# Patient Record
Sex: MALE | Race: WHITE | NOT HISPANIC OR LATINO
[De-identification: names, ages, dates, MRNs, and addresses within clinical notes are randomized per-mention and may not be internally consistent; named-entity substitution may affect disease eponyms.]

---

## 2023-11-28 PROBLEM — Z00.00 ENCOUNTER FOR PREVENTIVE HEALTH EXAMINATION: Status: ACTIVE | Noted: 2023-11-28

## 2023-12-04 ENCOUNTER — APPOINTMENT (OUTPATIENT)
Dept: PEDIATRIC ORTHOPEDIC SURGERY | Facility: CLINIC | Age: 28
End: 2023-12-04
Payer: COMMERCIAL

## 2023-12-04 PROCEDURE — 72082 X-RAY EXAM ENTIRE SPI 2/3 VW: CPT

## 2023-12-04 PROCEDURE — 99204 OFFICE O/P NEW MOD 45 MIN: CPT | Mod: 25

## 2024-01-29 ENCOUNTER — OUTPATIENT (OUTPATIENT)
Dept: OUTPATIENT SERVICES | Facility: HOSPITAL | Age: 29
LOS: 1 days | End: 2024-01-29

## 2024-01-29 VITALS
HEART RATE: 74 BPM | WEIGHT: 253.97 LBS | DIASTOLIC BLOOD PRESSURE: 83 MMHG | OXYGEN SATURATION: 98 % | TEMPERATURE: 98 F | HEIGHT: 69 IN | SYSTOLIC BLOOD PRESSURE: 123 MMHG | RESPIRATION RATE: 16 BRPM

## 2024-01-29 DIAGNOSIS — M41.125 ADOLESCENT IDIOPATHIC SCOLIOSIS, THORACOLUMBAR REGION: ICD-10-CM

## 2024-01-29 LAB
A1C WITH ESTIMATED AVERAGE GLUCOSE RESULT: 5.1 % — SIGNIFICANT CHANGE UP (ref 4–5.6)
ALBUMIN SERPL ELPH-MCNC: 4.7 G/DL — SIGNIFICANT CHANGE UP (ref 3.3–5)
ALP SERPL-CCNC: 79 U/L — SIGNIFICANT CHANGE UP (ref 40–120)
ALT FLD-CCNC: 49 U/L — HIGH (ref 4–41)
ANION GAP SERPL CALC-SCNC: 13 MMOL/L — SIGNIFICANT CHANGE UP (ref 7–14)
AST SERPL-CCNC: 30 U/L — SIGNIFICANT CHANGE UP (ref 4–40)
BILIRUB SERPL-MCNC: 0.5 MG/DL — SIGNIFICANT CHANGE UP (ref 0.2–1.2)
BLD GP AB SCN SERPL QL: NEGATIVE — SIGNIFICANT CHANGE UP
BUN SERPL-MCNC: 9 MG/DL — SIGNIFICANT CHANGE UP (ref 7–23)
CALCIUM SERPL-MCNC: 9.5 MG/DL — SIGNIFICANT CHANGE UP (ref 8.4–10.5)
CHLORIDE SERPL-SCNC: 103 MMOL/L — SIGNIFICANT CHANGE UP (ref 98–107)
CO2 SERPL-SCNC: 24 MMOL/L — SIGNIFICANT CHANGE UP (ref 22–31)
CREAT SERPL-MCNC: 0.71 MG/DL — SIGNIFICANT CHANGE UP (ref 0.5–1.3)
EGFR: 129 ML/MIN/1.73M2 — SIGNIFICANT CHANGE UP
ESTIMATED AVERAGE GLUCOSE: 100 — SIGNIFICANT CHANGE UP
GLUCOSE SERPL-MCNC: 78 MG/DL — SIGNIFICANT CHANGE UP (ref 70–99)
HCT VFR BLD CALC: 43.7 % — SIGNIFICANT CHANGE UP (ref 39–50)
HGB BLD-MCNC: 14.9 G/DL — SIGNIFICANT CHANGE UP (ref 13–17)
MCHC RBC-ENTMCNC: 29.4 PG — SIGNIFICANT CHANGE UP (ref 27–34)
MCHC RBC-ENTMCNC: 34.1 GM/DL — SIGNIFICANT CHANGE UP (ref 32–36)
MCV RBC AUTO: 86.4 FL — SIGNIFICANT CHANGE UP (ref 80–100)
NRBC # BLD: 0 /100 WBCS — SIGNIFICANT CHANGE UP (ref 0–0)
NRBC # FLD: 0 K/UL — SIGNIFICANT CHANGE UP (ref 0–0)
PLATELET # BLD AUTO: 226 K/UL — SIGNIFICANT CHANGE UP (ref 150–400)
POTASSIUM SERPL-MCNC: 3.6 MMOL/L — SIGNIFICANT CHANGE UP (ref 3.5–5.3)
POTASSIUM SERPL-SCNC: 3.6 MMOL/L — SIGNIFICANT CHANGE UP (ref 3.5–5.3)
PROT SERPL-MCNC: 7.8 G/DL — SIGNIFICANT CHANGE UP (ref 6–8.3)
RBC # BLD: 5.06 M/UL — SIGNIFICANT CHANGE UP (ref 4.2–5.8)
RBC # FLD: 12.2 % — SIGNIFICANT CHANGE UP (ref 10.3–14.5)
RH IG SCN BLD-IMP: POSITIVE — SIGNIFICANT CHANGE UP
RH IG SCN BLD-IMP: POSITIVE — SIGNIFICANT CHANGE UP
SODIUM SERPL-SCNC: 140 MMOL/L — SIGNIFICANT CHANGE UP (ref 135–145)
WBC # BLD: 7.56 K/UL — SIGNIFICANT CHANGE UP (ref 3.8–10.5)
WBC # FLD AUTO: 7.56 K/UL — SIGNIFICANT CHANGE UP (ref 3.8–10.5)

## 2024-01-29 NOTE — H&P PST ADULT - PROBLEM SELECTOR PLAN 1
Patient tentatively scheduled for T4-L4 posterior spinal fusion with instrumentation,  - muscle flap reconstruction on 02/07/2024.  Pre-op instructions provided. Pt given verbal and written instructions with teach back on chlorhexidine wash x 3 days  and pepcid. Pt verbalized understanding with return demonstration.  Labs done.

## 2024-01-29 NOTE — H&P PST ADULT - HISTORY OF PRESENT ILLNESS
28 year old male with pre op dx of adolescent idiopathic scoliosis of thoracolumbar region is scheduled for T4-L4 posterior spinal fusion with instrumentation,  - muscle flap reconstruction. 28 year old male with hx of scoliosis presents to PST with pre op dx of adolescent idiopathic scoliosis of thoracolumbar region is scheduled for T4-L4 posterior spinal fusion with instrumentation,  - muscle flap reconstruction.

## 2024-01-29 NOTE — H&P PST ADULT - ATTENDING COMMENTS
Posterior thoracic and lumbar Spine fusion with segmental instrumentation utilizing fluoroscopic/ Airo navigation, osteotomies, cell saver, multimodality spinal cord monitoring, autograft and allograft for bonegrafting is planned. All risks and complications including but not included to infection, nonunion, implant failure, resurgery, extension of fusion, junctional arthritis, junctional kyphosis, less than full correction, shoulder imbalance, coronal imbalance, sagittal imbalance, decompensation, seizures, stroke, DVT/PE, visual impairment, organ injury, vascular injury, mortality, csf leak, pleural leak, pulmonary complications,  paralysis (complete/incomplete/bladder/bowel), screw misplacement, need for screw removal, no improvement in back pain, explained. Staging of surgery, abandonment of surgery explained. All questions answered. Benefits and alternatives discussed. Understanding verbalized. Rib resections discussed. Intraspinal duramorph explained. Post op pain management and recovery discussed. Airo navigation explained. Wake up test explained and understanding confirmed.

## 2024-01-29 NOTE — H&P PST ADULT - MUSCULOSKELETAL COMMENTS
Pre op dx- Adolescent idiopathic scoliosis of thoracolumbar region hx of scoliosis . Pt states ,he was  diagnosed with a possible curvature in his back by the primary physician 2 year ago. , denies back pain . He admits to having a strong family history of scoliosis, his sister, nieces and nephews who was treated in the past with a brace

## 2024-01-30 LAB
MRSA PCR RESULT.: SIGNIFICANT CHANGE UP
S AUREUS DNA NOSE QL NAA+PROBE: SIGNIFICANT CHANGE UP

## 2024-02-12 ENCOUNTER — TRANSCRIPTION ENCOUNTER (OUTPATIENT)
Age: 29
End: 2024-02-12

## 2024-02-12 ENCOUNTER — APPOINTMENT (OUTPATIENT)
Dept: PEDIATRIC ORTHOPEDIC SURGERY | Facility: CLINIC | Age: 29
End: 2024-02-12
Payer: COMMERCIAL

## 2024-02-12 DIAGNOSIS — Z78.9 OTHER SPECIFIED HEALTH STATUS: ICD-10-CM

## 2024-02-12 PROCEDURE — 72082 X-RAY EXAM ENTIRE SPI 2/3 VW: CPT

## 2024-02-12 PROCEDURE — 99215 OFFICE O/P EST HI 40 MIN: CPT

## 2024-02-13 ENCOUNTER — TRANSCRIPTION ENCOUNTER (OUTPATIENT)
Age: 29
End: 2024-02-13

## 2024-02-13 PROBLEM — Z78.9 NO PERTINENT PAST MEDICAL HISTORY: Status: RESOLVED | Noted: 2023-12-04 | Resolved: 2024-02-13

## 2024-02-13 NOTE — PHYSICAL EXAM
[Normal] : Patient is awake and alert and in no acute distress [Oriented x3] : oriented to person, place, and time [Conjunctiva] : normal conjunctiva [Eyelids] : normal eyelids [Pupils] : pupils were equal and round [Ears] : normal ears [Nose] : normal nose [Lips] : normal lips [Rash] : no rash [FreeTextEntry1] : General: Patient is awake and alert and in no acute distress . oriented to person, place, and time. well developed, well nourished, cooperative.   Skin: The skin is intact, warm, pink, and dry over the area examined.    Eyes: normal conjunctiva, normal eyelids and pupils were equal and round.   ENT: normal ears, normal nose and normal lips.  Cardiovascular: There is brisk capillary refill in the digits of the affected extremity. They are symmetric pulses in the bilateral upper and lower extremities, positive peripheral pulses, brisk capillary refill, but no peripheral edema.  Respiratory: The patient is in no apparent respiratory distress. They're taking full deep breaths without use of accessory muscles or evidence of audible wheezes or stridor without the use of a stethoscope, normal respiratory effort.   Neurological: 5/5 motor strength in the main muscle groups of bilateral lower extremities, sensory intact in bilateral lower extremities.   Musculoskeletal: Examination of the back reveals significant Right greater than left shoulder asymmetry. Significant waist asymmetry.  On forward bending, moderate right thoracic and large left thoracolumbar prominence noted.  Patient is able to bend forward and touch the toes as well bend backwards without pain.  Lateral flexion is symmetrical and is pain free.  Straight leg raising test is free to more than 70 degrees. Moderate postural kyphosis, fully correctable on hyperextension.  Neurological examination reveals a grade 5/5 muscle power.  Sensation is intact to crude touch and pinprick.  Deep tendon reflexes are 1+ with ankle jerk and knee jerk.  The plantars are bilaterally down going.  Superficial abdominal reflexes are symmetric and intact.  The biceps and triceps reflexes are 1+.     There is no hairy patch, lipoma, sinus in the back.  There is no pes cavus, asymmetry of calves, significant leg length discrepancy or significant cafe-au-lait spots.

## 2024-02-13 NOTE — REASON FOR VISIT
[Follow Up] : a follow up visit [Family Member] : family member [Patient] : patient [FreeTextEntry1] : Scoliosis

## 2024-02-13 NOTE — REVIEW OF SYSTEMS
[Change in Activity] : no change in activity [Fever Above 102] : no fever [Malaise] : no malaise [Rash] : no rash [Nasal Stuffiness] : no nasal congestion [Wheezing] : no wheezing [Cough] : no cough [Back Pain] : ~T no back pain [Muscle Aches] : no muscle aches

## 2024-02-13 NOTE — ASSESSMENT
[FreeTextEntry1] : Hayden is a 28-year-old man who has a moderate sized scoliosis of 58/34 deg with no back pain.   Today's visit included obtaining the history from the patient. Patient is 28 years of age. Patient is skeletally mature. This curve has the potential to progress despite skeletal maturity due to its magnitude. It is already a large 58/34 degree curve. Patient was treated in the past with a brace. The options of surgery versus continued observation were again discussed. Patient wants to go ahead with surgery as the deformity seems to have progressed. Patient does understand larger curves, based on natural history, tend to progress 1-2 /1 in adult life. Curves 90 or more can cause significant cardiac and pulmonary compromise. Large curves are likely at risk for back pain, arthritis in adult life. Full spine MRI with no evidence of intraspinal abnormalities. He has undergone cardiology and pulmonology clearance.    Surgical procedure discussed at length. Surgery including spine fusion with instrumentation, osteotomies, Cell Saver, multimodal spinal cord monitoring, bone grafting (autograft/allograft ), thoracoplasty, , and navigated guidance versus fluoroscopic guidance and complex wound closure is planned. Perioperative plan discussed. Wakeup test discussed. Transfusion risk discussed. Neural monitoring explained. Possible need for rib resection has been discussed. Use of fluoroscopy and AIRO navigation explained. Infection prevention steps discussed. Postoperative pain management protocol discussed. Intraspinal Duramorph discussed. Preoperative and postoperative instructions reviewed. Hospital day is usually about 3-4 days with one night in the PICU. We have implemented a rapid recovery pathway that incorporates a micro-dose of intraspinal Duramorph at the time of surgery which eliminate the need for opioid PCA and decreases opioid needs postoperatively for pain control. This also decreases constipation rate and allows patients to resume diet on the same day of surgery. Pain management is done in collaboration with a pediatric pain specialist. We have a dedicated intraoperative and postoperative pediatric spine team that includes pediatric spine anesthesiologists, neurologist for intraoperative or real-time spinal cord monitoring to increase the safety of surgery, dedicated surgical team, pediatric hospitalist,  and  along with child life therapists. This approach has allowed for optimal management of patient's, increased surgical safety, decrease risk of blood transfusion, decreased need for opioid and allows for patient to be discharged to home earlier. At discharge the patient is able to walk and climb stairs independently. We will arrange for visiting nurse services for home care as needed. Sutures are dissolvable and wound closure was done by plastic surgery which decreases the risk of infection. We also utilized intraoperative low-dose CT scan to ensure accuracy of spinal implants. In addition we perform multimodal spinal cord monitoring and real-time which is supervised by neurophysiologist and neurologists to decrease the risk of neurological injury and improve safety of the surgical procedure. This approach has improved surgical safety, accuracy and efficiency thereby ensuring superior patient now comes. In addition [facility] is the only [place] certified [facility] in [facility], ensuring the highest level of nursing care.   All the risks and complications of surgery including the risk of infection, nonunion, implant failure, complete paralysis, incomplete paralysis, bladder/bowel paralysis, organ injury, vascular injury, mortality, CSF leak, pleural leak, decompensation, resurgery, extension of fusion, junctional kyphosis, arthritis, organ injury, vascular injury, mortality, screw misplacement, and need for screw removal were explained. Informed consent obtained from patient. All questions were answered. Understanding verbalized. We will proceed with surgery as planned on 02/14/2024.  He has been seen by [facility] orthotist today and provided with custom molded rib binder to be used for postoperative pain management.  Appropriate fit and function has been discussed at length with family.  ICatrachita, have acted as a scribe and documented the above information for Dr. Barajas on 02/12/2024.   The Physician and Advanced clinical provider combined spent 45 minutes on HPI, Clinical exam, ordering/ reviewing all imaging, reviewing any existing record, reviewing findings and counseling patient to treatment, differentials, etiology, prognosis, natural history, implications on ADLs, activities limitations/modifications, answering questions and addressing concerns, treatment goals and documenting in the EHR. Physician discussed the details of surgery, surgical and post op plan, risks and complications and all the imaging and answered all questions.

## 2024-02-13 NOTE — HISTORY OF PRESENT ILLNESS
[FreeTextEntry1] : Hayden is a 28-year-old man who presents to the clinic today with his wife for a preoperative visit for a posterior spinal fusion with instrumentation scheduled to take place 02/14/2024. Full spine MRI has been done without significant findings. Patient has also undergone cardiology and pulmonary function clearance. He denies back pain.  He admits to having a strong family history of scoliosis, his sister, nieces and nephews who was treated in the past with a brace.  He denies radiating pain/numbness or tingling going into his fingers and toes.  He denies urinary/bowel incontinence.  The patient is otherwise doing well. No complaints of malaise or discomfort. Here for preoperative consultation

## 2024-02-13 NOTE — DATA REVIEWED
[de-identified] : AP prone and left and right bending films also done for preoperative planning.  MRI CERVICAL SPINE WO CONTRAST from [facility]: 01/02/2024 independently reviewed  IMPRESSION: Small central disc herniations C4-5 and C5-6.  Partially imaged thoracic scoliosis.  MRI THORACIC SPINE WO CONTRAST from [facility]: 01/02/2024: independently reviewed  IMPRESSION: 1. Right convexity thoracic scoliosis.  2. No significant central spinal canal stenosis or foraminal narrowing.   MRI LUMBAR SPINE WO CONTRAST from [facility]: 01/02/2024: independently reviewed  IMPRESSION: Slight thoracolumbar levoscoliosis.  L4/L5 disc bulge with small to moderate right foraminal/lateral disc herniation with annular tear. Impingement of the right L4 nerve with mild right foraminal stenosis.  PA Scoliosis x rays were ordered, done and then independently reviewed today: T6-T9, 58 degrees right, T10-L4, 34 degrees left Risser (5).  The triradiate cartilage is closed. Bran 8. No congenital abnormalities. No Pelvic obliquity.   Lat Scoliosis x rays  were ordered, done and then independently reviewed today: Normal kyphotic/lordotic curvature. No Scheuermann's kyphosis noted. No spondylolisthesis or spondylolysis. No compression fractures or vertebral wedging.

## 2024-02-14 ENCOUNTER — INPATIENT (INPATIENT)
Facility: HOSPITAL | Age: 29
LOS: 2 days | Discharge: HOME HEALTH | End: 2024-02-17
Attending: ORTHOPAEDIC SURGERY | Admitting: ORTHOPAEDIC SURGERY
Payer: COMMERCIAL

## 2024-02-14 ENCOUNTER — TRANSCRIPTION ENCOUNTER (OUTPATIENT)
Age: 29
End: 2024-02-14

## 2024-02-14 VITALS
HEART RATE: 79 BPM | HEIGHT: 69 IN | DIASTOLIC BLOOD PRESSURE: 90 MMHG | WEIGHT: 253.97 LBS | SYSTOLIC BLOOD PRESSURE: 148 MMHG | TEMPERATURE: 98 F | RESPIRATION RATE: 14 BRPM | OXYGEN SATURATION: 99 %

## 2024-02-14 DIAGNOSIS — M41.125 ADOLESCENT IDIOPATHIC SCOLIOSIS, THORACOLUMBAR REGION: ICD-10-CM

## 2024-02-14 LAB
ALBUMIN SERPL ELPH-MCNC: 3.8 G/DL — SIGNIFICANT CHANGE UP (ref 3.3–5)
ALP SERPL-CCNC: 58 U/L — SIGNIFICANT CHANGE UP (ref 40–120)
ALT FLD-CCNC: 34 U/L — SIGNIFICANT CHANGE UP (ref 4–41)
ANION GAP SERPL CALC-SCNC: 12 MMOL/L — SIGNIFICANT CHANGE UP (ref 7–14)
AST SERPL-CCNC: 44 U/L — HIGH (ref 4–40)
BASOPHILS # BLD AUTO: 0.03 K/UL — SIGNIFICANT CHANGE UP (ref 0–0.2)
BASOPHILS NFR BLD AUTO: 0.2 % — SIGNIFICANT CHANGE UP (ref 0–2)
BILIRUB SERPL-MCNC: 1 MG/DL — SIGNIFICANT CHANGE UP (ref 0.2–1.2)
BUN SERPL-MCNC: 12 MG/DL — SIGNIFICANT CHANGE UP (ref 7–23)
CA-I BLD-SCNC: 1.08 MMOL/L — LOW (ref 1.15–1.29)
CALCIUM SERPL-MCNC: 8.4 MG/DL — SIGNIFICANT CHANGE UP (ref 8.4–10.5)
CHLORIDE SERPL-SCNC: 107 MMOL/L — SIGNIFICANT CHANGE UP (ref 98–107)
CO2 SERPL-SCNC: 20 MMOL/L — LOW (ref 22–31)
CREAT SERPL-MCNC: 0.8 MG/DL — SIGNIFICANT CHANGE UP (ref 0.5–1.3)
EGFR: 124 ML/MIN/1.73M2 — SIGNIFICANT CHANGE UP
EOSINOPHIL # BLD AUTO: 0 K/UL — SIGNIFICANT CHANGE UP (ref 0–0.5)
EOSINOPHIL NFR BLD AUTO: 0 % — SIGNIFICANT CHANGE UP (ref 0–6)
GLUCOSE SERPL-MCNC: 129 MG/DL — HIGH (ref 70–99)
HCT VFR BLD CALC: 33.8 % — LOW (ref 39–50)
HGB BLD-MCNC: 11.5 G/DL — LOW (ref 13–17)
IANC: 14.38 K/UL — HIGH (ref 1.8–7.4)
IMM GRANULOCYTES NFR BLD AUTO: 1.1 % — HIGH (ref 0–0.9)
LYMPHOCYTES # BLD AUTO: 1.04 K/UL — SIGNIFICANT CHANGE UP (ref 1–3.3)
LYMPHOCYTES # BLD AUTO: 6.5 % — LOW (ref 13–44)
MAGNESIUM SERPL-MCNC: 1.7 MG/DL — SIGNIFICANT CHANGE UP (ref 1.6–2.6)
MCHC RBC-ENTMCNC: 29.3 PG — SIGNIFICANT CHANGE UP (ref 27–34)
MCHC RBC-ENTMCNC: 34 GM/DL — SIGNIFICANT CHANGE UP (ref 32–36)
MCV RBC AUTO: 86.2 FL — SIGNIFICANT CHANGE UP (ref 80–100)
MONOCYTES # BLD AUTO: 0.3 K/UL — SIGNIFICANT CHANGE UP (ref 0–0.9)
MONOCYTES NFR BLD AUTO: 1.9 % — LOW (ref 2–14)
NEUTROPHILS # BLD AUTO: 14.38 K/UL — HIGH (ref 1.8–7.4)
NEUTROPHILS NFR BLD AUTO: 90.3 % — HIGH (ref 43–77)
NRBC # BLD: 0 /100 WBCS — SIGNIFICANT CHANGE UP (ref 0–0)
NRBC # FLD: 0 K/UL — SIGNIFICANT CHANGE UP (ref 0–0)
PHOSPHATE SERPL-MCNC: 3.5 MG/DL — SIGNIFICANT CHANGE UP (ref 2.5–4.5)
PLATELET # BLD AUTO: 215 K/UL — SIGNIFICANT CHANGE UP (ref 150–400)
POTASSIUM SERPL-MCNC: 4.8 MMOL/L — SIGNIFICANT CHANGE UP (ref 3.5–5.3)
POTASSIUM SERPL-SCNC: 4.8 MMOL/L — SIGNIFICANT CHANGE UP (ref 3.5–5.3)
PROT SERPL-MCNC: 5.8 G/DL — LOW (ref 6–8.3)
RBC # BLD: 3.92 M/UL — LOW (ref 4.2–5.8)
RBC # FLD: 12.2 % — SIGNIFICANT CHANGE UP (ref 10.3–14.5)
SODIUM SERPL-SCNC: 139 MMOL/L — SIGNIFICANT CHANGE UP (ref 135–145)
WBC # BLD: 15.93 K/UL — HIGH (ref 3.8–10.5)
WBC # FLD AUTO: 15.93 K/UL — HIGH (ref 3.8–10.5)

## 2024-02-14 PROCEDURE — 22216 INCIS ADDL SPINE SEGMENT: CPT

## 2024-02-14 PROCEDURE — 22843 INSERT SPINE FIXATION DEVICE: CPT

## 2024-02-14 PROCEDURE — 22212 INCIS 1 VERTEBRAL SEG THORAC: CPT

## 2024-02-14 PROCEDURE — 61783 SCAN PROC SPINAL: CPT

## 2024-02-14 PROCEDURE — 22802 ARTHRD PST DFRM 7-12 VRT SGM: CPT

## 2024-02-14 RX ORDER — MAGNESIUM SULFATE 500 MG/ML
2 VIAL (ML) INJECTION ONCE
Refills: 0 | Status: COMPLETED | OUTPATIENT
Start: 2024-02-14 | End: 2024-02-14

## 2024-02-14 RX ORDER — DIAZEPAM 5 MG
10 TABLET ORAL EVERY 6 HOURS
Refills: 0 | Status: DISCONTINUED | OUTPATIENT
Start: 2024-02-14 | End: 2024-02-17

## 2024-02-14 RX ORDER — OXYCODONE HYDROCHLORIDE 5 MG/1
5 TABLET ORAL ONCE
Refills: 0 | Status: DISCONTINUED | OUTPATIENT
Start: 2024-02-14 | End: 2024-02-15

## 2024-02-14 RX ORDER — DIPHENHYDRAMINE HCL 50 MG
25 CAPSULE ORAL ONCE
Refills: 0 | Status: COMPLETED | OUTPATIENT
Start: 2024-02-14 | End: 2024-02-14

## 2024-02-14 RX ORDER — DEXTROSE MONOHYDRATE, SODIUM CHLORIDE, AND POTASSIUM CHLORIDE 50; .745; 4.5 G/1000ML; G/1000ML; G/1000ML
1000 INJECTION, SOLUTION INTRAVENOUS
Refills: 0 | Status: DISCONTINUED | OUTPATIENT
Start: 2024-02-14 | End: 2024-02-14

## 2024-02-14 RX ORDER — HYDROMORPHONE HYDROCHLORIDE 2 MG/ML
0.5 INJECTION INTRAMUSCULAR; INTRAVENOUS; SUBCUTANEOUS
Refills: 0 | Status: DISCONTINUED | OUTPATIENT
Start: 2024-02-14 | End: 2024-02-15

## 2024-02-14 RX ORDER — OXYCODONE HYDROCHLORIDE 5 MG/1
10 TABLET ORAL EVERY 4 HOURS
Refills: 0 | Status: DISCONTINUED | OUTPATIENT
Start: 2024-02-14 | End: 2024-02-14

## 2024-02-14 RX ORDER — PANTOPRAZOLE SODIUM 20 MG/1
40 TABLET, DELAYED RELEASE ORAL
Refills: 0 | Status: DISCONTINUED | OUTPATIENT
Start: 2024-02-14 | End: 2024-02-17

## 2024-02-14 RX ORDER — CEFAZOLIN SODIUM 1 G
2000 VIAL (EA) INJECTION EVERY 8 HOURS
Refills: 0 | Status: COMPLETED | OUTPATIENT
Start: 2024-02-14 | End: 2024-02-15

## 2024-02-14 RX ORDER — ONDANSETRON 8 MG/1
4 TABLET, FILM COATED ORAL EVERY 6 HOURS
Refills: 0 | Status: DISCONTINUED | OUTPATIENT
Start: 2024-02-14 | End: 2024-02-17

## 2024-02-14 RX ORDER — SENNA PLUS 8.6 MG/1
2 TABLET ORAL AT BEDTIME
Refills: 0 | Status: DISCONTINUED | OUTPATIENT
Start: 2024-02-14 | End: 2024-02-17

## 2024-02-14 RX ORDER — POLYETHYLENE GLYCOL 3350 17 G/17G
17 POWDER, FOR SOLUTION ORAL DAILY
Refills: 0 | Status: DISCONTINUED | OUTPATIENT
Start: 2024-02-14 | End: 2024-02-17

## 2024-02-14 RX ORDER — MORPHINE SULFATE 50 MG/1
0.2 CAPSULE, EXTENDED RELEASE ORAL ONCE
Refills: 0 | Status: DISCONTINUED | OUTPATIENT
Start: 2024-02-14 | End: 2024-02-15

## 2024-02-14 RX ORDER — OXYCODONE HYDROCHLORIDE 5 MG/1
5 TABLET ORAL EVERY 4 HOURS
Refills: 0 | Status: DISCONTINUED | OUTPATIENT
Start: 2024-02-14 | End: 2024-02-17

## 2024-02-14 RX ORDER — OXYCODONE HYDROCHLORIDE 5 MG/1
10 TABLET ORAL EVERY 4 HOURS
Refills: 0 | Status: DISCONTINUED | OUTPATIENT
Start: 2024-02-14 | End: 2024-02-17

## 2024-02-14 RX ORDER — HYDROMORPHONE HYDROCHLORIDE 2 MG/ML
0.5 INJECTION INTRAMUSCULAR; INTRAVENOUS; SUBCUTANEOUS EVERY 4 HOURS
Refills: 0 | Status: DISCONTINUED | OUTPATIENT
Start: 2024-02-14 | End: 2024-02-15

## 2024-02-14 RX ORDER — NALOXONE HYDROCHLORIDE 4 MG/.1ML
0.1 SPRAY NASAL
Refills: 0 | Status: DISCONTINUED | OUTPATIENT
Start: 2024-02-14 | End: 2024-02-17

## 2024-02-14 RX ORDER — ACETAMINOPHEN 500 MG
650 TABLET ORAL EVERY 6 HOURS
Refills: 0 | Status: COMPLETED | OUTPATIENT
Start: 2024-02-14 | End: 2024-02-17

## 2024-02-14 RX ORDER — DIAZEPAM 5 MG
2 TABLET ORAL ONCE
Refills: 0 | Status: DISCONTINUED | OUTPATIENT
Start: 2024-02-14 | End: 2024-02-14

## 2024-02-14 RX ORDER — CALCIUM GLUCONATE 100 MG/ML
2 VIAL (ML) INTRAVENOUS ONCE
Refills: 0 | Status: COMPLETED | OUTPATIENT
Start: 2024-02-14 | End: 2024-02-14

## 2024-02-14 RX ORDER — MAGNESIUM HYDROXIDE 400 MG/1
30 TABLET, CHEWABLE ORAL EVERY 12 HOURS
Refills: 0 | Status: DISCONTINUED | OUTPATIENT
Start: 2024-02-14 | End: 2024-02-17

## 2024-02-14 RX ORDER — SODIUM CHLORIDE 9 MG/ML
1000 INJECTION, SOLUTION INTRAVENOUS
Refills: 0 | Status: DISCONTINUED | OUTPATIENT
Start: 2024-02-14 | End: 2024-02-17

## 2024-02-14 RX ORDER — DIAZEPAM 5 MG
10 TABLET ORAL EVERY 6 HOURS
Refills: 0 | Status: DISCONTINUED | OUTPATIENT
Start: 2024-02-14 | End: 2024-02-14

## 2024-02-14 RX ORDER — ONDANSETRON 8 MG/1
4 TABLET, FILM COATED ORAL ONCE
Refills: 0 | Status: DISCONTINUED | OUTPATIENT
Start: 2024-02-14 | End: 2024-02-15

## 2024-02-14 RX ORDER — KETOROLAC TROMETHAMINE 30 MG/ML
30 SYRINGE (ML) INJECTION EVERY 6 HOURS
Refills: 0 | Status: DISCONTINUED | OUTPATIENT
Start: 2024-02-14 | End: 2024-02-17

## 2024-02-14 RX ADMIN — Medication 650 MILLIGRAM(S): at 21:07

## 2024-02-14 RX ADMIN — Medication 25 MILLIGRAM(S): at 19:30

## 2024-02-14 RX ADMIN — Medication 30 MILLIGRAM(S): at 22:30

## 2024-02-14 RX ADMIN — SODIUM CHLORIDE 100 MILLILITER(S): 9 INJECTION, SOLUTION INTRAVENOUS at 17:29

## 2024-02-14 RX ADMIN — Medication 2 MILLIGRAM(S): at 17:34

## 2024-02-14 RX ADMIN — Medication 30 MILLIGRAM(S): at 22:02

## 2024-02-14 RX ADMIN — Medication 100 MILLIGRAM(S): at 23:58

## 2024-02-14 RX ADMIN — Medication 650 MILLIGRAM(S): at 22:00

## 2024-02-14 RX ADMIN — Medication 50 GRAM(S): at 19:30

## 2024-02-14 RX ADMIN — Medication 200 GRAM(S): at 21:10

## 2024-02-14 RX ADMIN — HYDROMORPHONE HYDROCHLORIDE 0.5 MILLIGRAM(S): 2 INJECTION INTRAMUSCULAR; INTRAVENOUS; SUBCUTANEOUS at 17:25

## 2024-02-14 RX ADMIN — HYDROMORPHONE HYDROCHLORIDE 0.5 MILLIGRAM(S): 2 INJECTION INTRAMUSCULAR; INTRAVENOUS; SUBCUTANEOUS at 17:55

## 2024-02-14 NOTE — ASU PREOP CHECKLIST - WEIGHT IN LBS
________________________________________________  TRANSITIONAL CARE PROGRAM:  As part of our commitment to your overall care and recovery, we would like to provide you with a resource to call a nurse in the presence of any needs after your discharge including:     -Questions about your recovery  -Assistance with arranging follow-up appointments  -Prescription related questions  -Assistance with other questions that you may have about your care     Please call me if you have any non-emergent questions or concerns.   Please call MD office for emergent questions or concerns.  Seek emergency assistance ([phone]) for severe symptoms.      Gretchen Becerril  Transitional Care RN  Hours: Monday-Friday 7:00 am to 3:30 PM  Phone: [phone]  ____________________________________________________    Follow-up    Merary Rosa MD within one week of discharge.  INR check on Thursday 11/5/2020 with coumadin dose adjustment as directed, and continue lovenox injections until instructed to stop (once INR is in therapeutic range).      Discharge Instructions     Diet: diabetic diet Activity:  activity as tolerated, no driving for 90 days.  Quarantine/self-isolate per CDC guidelines   Wound Care: as directed Disposition: Home with home health care     Other instructions:     Seek medical attention if new, recurrent, or worsening symptoms develop.      
253.9

## 2024-02-14 NOTE — ASU PREOP CHECKLIST - AS BP NONINV SITE
Care Transitions Program Week 5 Follow-up Call    Current Issues/Problems reviewed on call: Patient would like PT services at [facility] OP rehab.    Details of Interventions/Education completed on call:   Per patient she did receive a call from Jorge L at [facility] and they are trying to get her scheduled for OP services but patient is not sure if they take her insurance. Patient also informed writer she has Community [facility]. Patient states Krupa Alfredo is the  [phone] or [phone]. Patient agreeable for writer to outreach to Krupa. Writer unable to reach case manger but left voice message.    Review of Systems:   Care Transition System Evaluation:    General Symptoms: Fatigue  Fever: is not present  Temperature:    Pain:    Pain Location: generalized arthritic pain not rated  Respiratory Symptoms: Shortness of breath  Shortness of Breath: SOB with mild exertion  Cardiovascular Symptoms: Fatigue; Leg pain  Hours of Uninterrupted Sleep:    Sleeping Behaviors: Reports no problem  GI Symptoms: None  Abdominal Tenderness:     Symptoms: None  Bowel Ostomy Type:    Genitourinary Symptoms:    Urinary Elimination: Voiding, no difficulities  Feeding Tube Type: None  Skin Symptoms: None  Wound Type: None    The patient istaking all medications as prescribed. The patient did not have questions or concerns regarding the medications prescribed.    Next Care Transitions follow-up call will call in 2-3 days unless patient calls first.    Plan for next follow-up call: Follow-up with Krupa BIRCH with /Community care re OP therapy     Brenda SALEEM [facility]  [facility]  Office: [phone]  E-Mail [email or web address]   left upper arm

## 2024-02-14 NOTE — CHART NOTE - NSCHARTNOTEFT_GEN_A_CORE
Patient seen and examined in PACU several hours s/p posterior spinal fusion T2-L1 PSF. Slightly groggy but pain has been well controlled. No reported numbness or tingling of extremities. No chest pain or difficulty breathing.     Objective  ICU Vital Signs Last 24 Hrs  T(C): 36.5 (01 Mar 2023 13:55), Max: 36.6 (01 Mar 2023 06:36)  T(F): 97.7 (01 Mar 2023 13:55), Max: 97.7 (01 Mar 2023 13:55)  HR: 87 (01 Mar 2023 15:15) (63 - 106)  BP: 123/75 (01 Mar 2023 15:15) (100/52 - 128/72)  BP(mean): 89 (01 Mar 2023 15:15) (75 - 91)  ABP: 149/64 (01 Mar 2023 15:15) (118/53 - 153/65)  ABP(mean): 87 (01 Mar 2023 15:15) (72 - 89)  RR: 15 (01 Mar 2023 15:15) (15 - 23)  SpO2: 98% (01 Mar 2023 15:15) (98% - 100%)    O2 Parameters below as of 01 Mar 2023 13:55  Patient On (Oxygen Delivery Method): room air    Physical Exam  General: Patient laying in bed, NAD  Respiratory: Good respiratory effort   Spine:   HMV x 1 and HERB x 1 drains in place with sanguinous output   Dressing is clean, dry, and intact.   EHL/ FHL/ GS/ TA firing  Sensation is grossly intact along the length of bilateral upper and lower extremities.   No calf ttp   Moving toes freely.   BCR in all toes.   +2 DP pulses bilaterally    Assessment  27 y/o female with history of AIS, several hours s/p T2-L1 PSF POD #0    Plan  - Pain control PRN  - WBAT  - PT/ OT- out of bed as tolerated   - FU AM labs  - Drain monitoring  - DVT PPX- VCDs   - Incentive Spirometry encouraged  - Regular diet as tolerated  - bowel regimen

## 2024-02-14 NOTE — BRIEF OPERATIVE NOTE - OPERATION/FINDINGS
Adult idiopathic scoliosis now s/p T2-L1 posterior spinal fusion with plastic surgery closure (Dr. Shahid)

## 2024-02-14 NOTE — BRIEF OPERATIVE NOTE - NSICDXBRIEFPROCEDURE_GEN_ALL_CORE_FT
PROCEDURES:  Fusion of spine at 13 levels by posterior approach 14-Feb-2024 16:22:01  David Wallis

## 2024-02-15 ENCOUNTER — TRANSCRIPTION ENCOUNTER (OUTPATIENT)
Age: 29
End: 2024-02-15

## 2024-02-15 LAB
ANION GAP SERPL CALC-SCNC: 11 MMOL/L — SIGNIFICANT CHANGE UP (ref 7–14)
ANISOCYTOSIS BLD QL: SLIGHT — SIGNIFICANT CHANGE UP
BASOPHILS # BLD AUTO: 0.02 K/UL — SIGNIFICANT CHANGE UP (ref 0–0.2)
BASOPHILS NFR BLD AUTO: 0.1 % — SIGNIFICANT CHANGE UP (ref 0–2)
BUN SERPL-MCNC: 18 MG/DL — SIGNIFICANT CHANGE UP (ref 7–23)
CALCIUM SERPL-MCNC: 8.7 MG/DL — SIGNIFICANT CHANGE UP (ref 8.4–10.5)
CHLORIDE SERPL-SCNC: 104 MMOL/L — SIGNIFICANT CHANGE UP (ref 98–107)
CO2 SERPL-SCNC: 22 MMOL/L — SIGNIFICANT CHANGE UP (ref 22–31)
CREAT SERPL-MCNC: 0.75 MG/DL — SIGNIFICANT CHANGE UP (ref 0.5–1.3)
EGFR: 127 ML/MIN/1.73M2 — SIGNIFICANT CHANGE UP
EOSINOPHIL # BLD AUTO: 0 K/UL — SIGNIFICANT CHANGE UP (ref 0–0.5)
EOSINOPHIL NFR BLD AUTO: 0 % — SIGNIFICANT CHANGE UP (ref 0–6)
GLUCOSE SERPL-MCNC: 120 MG/DL — HIGH (ref 70–99)
HCT VFR BLD CALC: 28.7 % — LOW (ref 39–50)
HGB BLD-MCNC: 10 G/DL — LOW (ref 13–17)
HYPOCHROMIA BLD QL: SLIGHT — SIGNIFICANT CHANGE UP
IANC: 13.06 K/UL — HIGH (ref 1.8–7.4)
IMM GRANULOCYTES NFR BLD AUTO: 0.6 % — SIGNIFICANT CHANGE UP (ref 0–0.9)
LYMPHOCYTES # BLD AUTO: 1.12 K/UL — SIGNIFICANT CHANGE UP (ref 1–3.3)
LYMPHOCYTES # BLD AUTO: 7 % — LOW (ref 13–44)
MANUAL SMEAR VERIFICATION: SIGNIFICANT CHANGE UP
MCHC RBC-ENTMCNC: 30 PG — SIGNIFICANT CHANGE UP (ref 27–34)
MCHC RBC-ENTMCNC: 34.8 GM/DL — SIGNIFICANT CHANGE UP (ref 32–36)
MCV RBC AUTO: 86.2 FL — SIGNIFICANT CHANGE UP (ref 80–100)
MONOCYTES # BLD AUTO: 1.7 K/UL — HIGH (ref 0–0.9)
MONOCYTES NFR BLD AUTO: 10.6 % — SIGNIFICANT CHANGE UP (ref 2–14)
NEUTROPHILS # BLD AUTO: 13.06 K/UL — HIGH (ref 1.8–7.4)
NEUTROPHILS NFR BLD AUTO: 81.7 % — HIGH (ref 43–77)
NRBC # BLD: 0 /100 WBCS — SIGNIFICANT CHANGE UP (ref 0–0)
NRBC # FLD: 0 K/UL — SIGNIFICANT CHANGE UP (ref 0–0)
OVALOCYTES BLD QL SMEAR: SLIGHT — SIGNIFICANT CHANGE UP
PLAT MORPH BLD: NORMAL — SIGNIFICANT CHANGE UP
PLATELET # BLD AUTO: 206 K/UL — SIGNIFICANT CHANGE UP (ref 150–400)
PLATELET COUNT - ESTIMATE: NORMAL — SIGNIFICANT CHANGE UP
POTASSIUM SERPL-MCNC: 4.6 MMOL/L — SIGNIFICANT CHANGE UP (ref 3.5–5.3)
POTASSIUM SERPL-SCNC: 4.6 MMOL/L — SIGNIFICANT CHANGE UP (ref 3.5–5.3)
RBC # BLD: 3.33 M/UL — LOW (ref 4.2–5.8)
RBC # FLD: 12.1 % — SIGNIFICANT CHANGE UP (ref 10.3–14.5)
RBC BLD AUTO: SIGNIFICANT CHANGE UP
SODIUM SERPL-SCNC: 137 MMOL/L — SIGNIFICANT CHANGE UP (ref 135–145)
WBC # BLD: 15.99 K/UL — HIGH (ref 3.8–10.5)
WBC # FLD AUTO: 15.99 K/UL — HIGH (ref 3.8–10.5)

## 2024-02-15 PROCEDURE — 72082 X-RAY EXAM ENTIRE SPI 2/3 VW: CPT | Mod: 26

## 2024-02-15 RX ORDER — HYDROMORPHONE HYDROCHLORIDE 2 MG/ML
0.5 INJECTION INTRAMUSCULAR; INTRAVENOUS; SUBCUTANEOUS EVERY 4 HOURS
Refills: 0 | Status: DISCONTINUED | OUTPATIENT
Start: 2024-02-15 | End: 2024-02-17

## 2024-02-15 RX ADMIN — Medication 100 MILLIGRAM(S): at 10:07

## 2024-02-15 RX ADMIN — OXYCODONE HYDROCHLORIDE 10 MILLIGRAM(S): 5 TABLET ORAL at 19:00

## 2024-02-15 RX ADMIN — Medication 30 MILLIGRAM(S): at 21:12

## 2024-02-15 RX ADMIN — Medication 650 MILLIGRAM(S): at 04:48

## 2024-02-15 RX ADMIN — Medication 30 MILLIGRAM(S): at 09:53

## 2024-02-15 RX ADMIN — POLYETHYLENE GLYCOL 3350 17 GRAM(S): 17 POWDER, FOR SOLUTION ORAL at 13:52

## 2024-02-15 RX ADMIN — Medication 10 MILLIGRAM(S): at 21:11

## 2024-02-15 RX ADMIN — Medication 30 MILLIGRAM(S): at 15:59

## 2024-02-15 RX ADMIN — Medication 30 MILLIGRAM(S): at 21:42

## 2024-02-15 RX ADMIN — Medication 30 MILLIGRAM(S): at 04:49

## 2024-02-15 RX ADMIN — PANTOPRAZOLE SODIUM 40 MILLIGRAM(S): 20 TABLET, DELAYED RELEASE ORAL at 07:39

## 2024-02-15 RX ADMIN — Medication 650 MILLIGRAM(S): at 09:51

## 2024-02-15 RX ADMIN — Medication 650 MILLIGRAM(S): at 21:41

## 2024-02-15 RX ADMIN — Medication 650 MILLIGRAM(S): at 21:11

## 2024-02-15 RX ADMIN — Medication 650 MILLIGRAM(S): at 15:59

## 2024-02-15 RX ADMIN — OXYCODONE HYDROCHLORIDE 10 MILLIGRAM(S): 5 TABLET ORAL at 18:46

## 2024-02-15 NOTE — DISCHARGE NOTE NURSING/CASE MANAGEMENT/SOCIAL WORK - PATIENT PORTAL LINK FT
You can access the FollowMyHealth Patient Portal offered by [facility] by registering at the following website: [email or web address]. By joining [facility]’s FollowMyHealth portal, you will also be able to view your health information using other applications (apps) compatible with our system.

## 2024-02-15 NOTE — OCCUPATIONAL THERAPY INITIAL EVALUATION ADULT - GENERAL OBSERVATIONS, REHAB EVAL
Upon entry, patient semi-supine in bed, wife present at bedside, patient agreeable to OT eval, cleared for OT evaluation as per RN. Patient left seated in chair at bedside with wife present, call bell in reach, all lines/tubes intact, bed alarm activated, vitals stable, NAD.

## 2024-02-15 NOTE — PROVIDER CONTACT NOTE (OTHER) - ACTION/TREATMENT ORDERED:
Team aware, stated to reconnect + it is reconnected - PM RN notified as well to monitor.
Team aware, stated to monitor - PM RN notified.

## 2024-02-15 NOTE — PHYSICAL THERAPY INITIAL EVALUATION ADULT - GAIT DEVIATIONS NOTED, PT EVAL
patient noted to have increased right hip external rotation at rest and with ambulation, with no pain or numbness noted with Active and passive ROM of right hip ; Melissa Tracey, Ortho NP made aware/decreased jess/decreased step length

## 2024-02-15 NOTE — PROVIDER CONTACT NOTE (OTHER) - REASON
Patient was only able to void 200cc by TOV/ complaining of burning while attempting to urinate
Middle portion of hemovac catheter detached from hemovac drain, reconnected it again

## 2024-02-15 NOTE — PHYSICAL THERAPY INITIAL EVALUATION ADULT - PLANNED THERAPY INTERVENTIONS, PT EVAL
Patient left sitting in chair, in NAD, call bell in reach, all lines intact. MIKKI Saldaña made aware./balance training/bed mobility training/gait training/strengthening/transfer training

## 2024-02-15 NOTE — DISCHARGE NOTE NURSING/CASE MANAGEMENT/SOCIAL WORK - NSDCPNINST_GEN_ALL_CORE
Shower daily, wash incisions with mild soap and water, pat dry. No lotion , creams, or powder on incisions. Check incisions daily for signs of infection, redness, swelling drainage or temp greater than 101.0, CALL MD. No heavy lifting greater than 10-15 lbs. No driving if taking narcotics or until cleared by MD. Keep all follow up appointments and take all medications as prescribed.

## 2024-02-15 NOTE — DISCHARGE NOTE PROVIDER - PROVIDER TOKENS
PROVIDER:[TOKEN:[[id number]],FOLLOWUP:[1 month]],PROVIDER:[TOKEN:[[id number]],FOLLOWUP:[1 week]]

## 2024-02-15 NOTE — DISCHARGE NOTE PROVIDER - NSDCFUSCHEDAPPT_GEN_ALL_CORE_FT
Dwight Barajas  [facility]  PEDORTHO [address]  Scheduled Appointment: 03/28/2024

## 2024-02-15 NOTE — DISCHARGE NOTE PROVIDER - CARE PROVIDER_API CALL
Dwight Barajas  Orthopaedic Surgery  [address] [place] [address]  Phone: [phone]  Fax: [phone]  Follow Up Time: 1 month    Chandler Shahid  Plastic Surgery  [address] [place] [address], [address]  [place] [address]  Phone: [phone]  Fax: [phone]  Follow Up Time: 1 week

## 2024-02-15 NOTE — DISCHARGE NOTE PROVIDER - HOSPITAL COURSE
Hayedn is a 28 year old Male with history of adolescent idiopathic scoliosis who was admitted on 2/14/24 for scheduled PSIF. Patient underwent T2-L1 spinal fusion and tolerated procedure well. Wound closure was performed by plastic surgery. Two drains were placed during closure. His pain was well controlled on oral pain medications per rapid recovery protocol. He worked with physical therapy for transfers, ambulation, and stair training. Case management was on board for home care and equipment needs. Drain output was recorded daily. Post operative scoliosis x-rays were obtained and reviewed by orthopedic team. Hemoglobin dropped to 10.0 postoperatively due to acute blood loss anemia following spinal fusion. Prior to discharge surgical dressing was change and ____ drain was removed. Patient was cleared for safe discharge home. Patient was discharged home in stable condition. He will follow up with plastic surgeon for drain removal and further wound management. He will follow up with Dr. Barajas for routine post operative care. Hayden is a 28 year old Male with history of adolescent idiopathic scoliosis who was admitted on 2/14/24 for scheduled PSIF. Patient underwent T2-L1 spinal fusion and tolerated procedure well. Wound closure was performed by plastic surgery. Two drains were placed during closure. His pain was well controlled on oral pain medications per rapid recovery protocol. He worked with physical therapy for transfers, ambulation, and stair training. Case management was on board for home care and equipment needs. Drain output was recorded daily. Post operative scoliosis x-rays were obtained and reviewed by orthopedic team. Hemoglobin dropped to 10.0 postoperatively due to acute blood loss anemia following spinal fusion. Prior to discharge surgical dressing was change and 1 drain was removed. Patient was cleared for safe discharge home. Patient was discharged home in stable condition. He will follow up with plastic surgeon for drain removal and further wound management. He will follow up with Dr. Barajas for routine post operative care.

## 2024-02-15 NOTE — PATIENT PROFILE ADULT - FALL HARM RISK - HARM RISK INTERVENTIONS

## 2024-02-15 NOTE — DISCHARGE NOTE PROVIDER - CARE PROVIDERS DIRECT ADDRESSES
[email or web address]

## 2024-02-15 NOTE — DISCHARGE NOTE NURSING/CASE MANAGEMENT/SOCIAL WORK - NSSCNAMETXT_GEN_ALL_CORE
[facility] [phone] initial visit will be day after discharge home. A nurse will call prior to the home visit.

## 2024-02-15 NOTE — DISCHARGE NOTE NURSING/CASE MANAGEMENT/SOCIAL WORK - NSDCPEFALRISK_GEN_ALL_CORE
For information on Fall & Injury Prevention, visit: [email or web address] [place]  [email or web address] [place]  https://www.cdc.gov/steadi/patient.html

## 2024-02-15 NOTE — DISCHARGE NOTE PROVIDER - NSDCFUADDINST_GEN_ALL_CORE_FT
- Pain medications as prescribed  - Keep dressing clean and dry, sponge bath only at this time. Measure drain output daily as discussed. Record output and bring to follow up visit with Dr. Shahid.   - Return to hospital and call Dr. Barajas's office if you develop uncontrolled pain, fever, discharge from incision site, numbness or tingling.   - Follow up with Dr. Barajas in 1 month. Call office at [phone] to make an appointment.   - Follow up with Dr. Shahid in 1 week. Call office to make appointment

## 2024-02-15 NOTE — PHYSICAL THERAPY INITIAL EVALUATION ADULT - GENERAL OBSERVATIONS, REHAB EVAL
Pt found in bed with head of bed elevated; HR 74bpm; +Hemovac x 1 and +HERB x 1 to spinal incision; patient agreeable to PT; wife at bedside.

## 2024-02-15 NOTE — PROVIDER CONTACT NOTE (OTHER) - SITUATION
Pt. DTV by 18:45, only able to void 200cc and states "it burns him when he pees"
Middle portion of hemovac catheter detached from hemovac drain, reconnected it again

## 2024-02-15 NOTE — DISCHARGE NOTE PROVIDER - NSDCMRMEDTOKEN_GEN_ALL_CORE_FT
acetaminophen 325 mg oral tablet: 2 tab(s) orally every 6 hours  diazePAM 10 mg oral tablet: 1 tab(s) orally every 8 hours as needed for muscle spasms MDD: 30mg  polyethylene glycol 3350 oral powder for reconstitution: 17 gram(s) orally once a day  senna leaf extract oral tablet: 2 tab(s) orally once a day (at bedtime) as needed for  constipation   acetaminophen 325 mg oral tablet: 2 tab(s) orally every 6 hours  diazePAM 10 mg oral tablet: 1 tab(s) orally every 8 hours as needed for muscle spasms MDD: 30mg  ibuprofen 400 mg oral tablet: 1 tab(s) orally every 6 hours as needed for  mild pain  Narcan 4 mg/0.1 mL nasal spray: 4 milligram(s) intranasally once as needed for  antidote 1 spray in alternating nostrils every 2-3 minutes as needed  oxyCODONE 10 mg oral tablet: 1 tab(s) orally every 6 hours as needed for Severe Pain (7 - 10) MDD: 4 tablets  polyethylene glycol 3350 oral powder for reconstitution: 17 gram(s) orally once a day  senna leaf extract oral tablet: 2 tab(s) orally once a day (at bedtime) as needed for  constipation

## 2024-02-16 RX ORDER — SENNA PLUS 8.6 MG/1
2 TABLET ORAL
Qty: 0 | Refills: 0 | DISCHARGE
Start: 2024-02-16

## 2024-02-16 RX ORDER — OXYCODONE HYDROCHLORIDE 5 MG/1
1 TABLET ORAL
Qty: 28 | Refills: 0
Start: 2024-02-16 | End: 2024-02-22

## 2024-02-16 RX ORDER — IBUPROFEN 200 MG
1 TABLET ORAL
Qty: 28 | Refills: 0
Start: 2024-02-16 | End: 2024-02-22

## 2024-02-16 RX ORDER — POLYETHYLENE GLYCOL 3350 17 G/17G
17 POWDER, FOR SOLUTION ORAL
Qty: 0 | Refills: 0 | DISCHARGE
Start: 2024-02-16

## 2024-02-16 RX ORDER — NALOXONE HYDROCHLORIDE 4 MG/.1ML
4 SPRAY NASAL
Qty: 1 | Refills: 0
Start: 2024-02-16

## 2024-02-16 RX ORDER — ACETAMINOPHEN 500 MG
2 TABLET ORAL
Qty: 0 | Refills: 0 | DISCHARGE
Start: 2024-02-16

## 2024-02-16 RX ORDER — DIAZEPAM 5 MG
1 TABLET ORAL
Qty: 21 | Refills: 0
Start: 2024-02-16 | End: 2024-02-22

## 2024-02-16 RX ADMIN — Medication 30 MILLIGRAM(S): at 19:15

## 2024-02-16 RX ADMIN — Medication 30 MILLIGRAM(S): at 04:28

## 2024-02-16 RX ADMIN — Medication 650 MILLIGRAM(S): at 04:23

## 2024-02-16 RX ADMIN — OXYCODONE HYDROCHLORIDE 10 MILLIGRAM(S): 5 TABLET ORAL at 07:53

## 2024-02-16 RX ADMIN — OXYCODONE HYDROCHLORIDE 10 MILLIGRAM(S): 5 TABLET ORAL at 20:48

## 2024-02-16 RX ADMIN — OXYCODONE HYDROCHLORIDE 10 MILLIGRAM(S): 5 TABLET ORAL at 02:08

## 2024-02-16 RX ADMIN — Medication 650 MILLIGRAM(S): at 11:39

## 2024-02-16 RX ADMIN — Medication 10 MILLIGRAM(S): at 10:57

## 2024-02-16 RX ADMIN — Medication 650 MILLIGRAM(S): at 18:43

## 2024-02-16 RX ADMIN — ONDANSETRON 4 MILLIGRAM(S): 8 TABLET, FILM COATED ORAL at 04:28

## 2024-02-16 RX ADMIN — ONDANSETRON 4 MILLIGRAM(S): 8 TABLET, FILM COATED ORAL at 18:50

## 2024-02-16 RX ADMIN — Medication 30 MILLIGRAM(S): at 18:43

## 2024-02-16 RX ADMIN — OXYCODONE HYDROCHLORIDE 10 MILLIGRAM(S): 5 TABLET ORAL at 15:34

## 2024-02-16 RX ADMIN — Medication 650 MILLIGRAM(S): at 00:00

## 2024-02-16 RX ADMIN — POLYETHYLENE GLYCOL 3350 17 GRAM(S): 17 POWDER, FOR SOLUTION ORAL at 23:13

## 2024-02-16 RX ADMIN — OXYCODONE HYDROCHLORIDE 10 MILLIGRAM(S): 5 TABLET ORAL at 02:38

## 2024-02-16 RX ADMIN — Medication 650 MILLIGRAM(S): at 19:15

## 2024-02-16 RX ADMIN — Medication 650 MILLIGRAM(S): at 11:00

## 2024-02-16 RX ADMIN — OXYCODONE HYDROCHLORIDE 10 MILLIGRAM(S): 5 TABLET ORAL at 21:18

## 2024-02-16 RX ADMIN — Medication 10 MILLIGRAM(S): at 04:23

## 2024-02-16 RX ADMIN — Medication 30 MILLIGRAM(S): at 11:30

## 2024-02-16 RX ADMIN — Medication 10 MILLIGRAM(S): at 18:47

## 2024-02-16 RX ADMIN — Medication 30 MILLIGRAM(S): at 10:57

## 2024-02-16 RX ADMIN — PANTOPRAZOLE SODIUM 40 MILLIGRAM(S): 20 TABLET, DELAYED RELEASE ORAL at 04:30

## 2024-02-16 RX ADMIN — Medication 30 MILLIGRAM(S): at 05:00

## 2024-02-16 NOTE — PROGRESS NOTE PEDS - SUBJECTIVE AND OBJECTIVE BOX
SUBJECTIVE: No acute events overnight. Pain controlled. Walked with PT yesterday.     OBJECTIVE  Vital Signs Last 24 Hrs  T(C): 36.7 (16 Feb 2024 05:06), Max: 37.2 (15 Feb 2024 17:55)  T(F): 98.1 (16 Feb 2024 05:06), Max: 98.9 (15 Feb 2024 17:55)  HR: 107 (16 Feb 2024 05:06) (75 - 107)  BP: 125/63 (16 Feb 2024 05:06) (123/67 - 156/69)  BP(mean): --  RR: 17 (16 Feb 2024 05:06) (17 - 18)  SpO2: 96% (16 Feb 2024 05:06) (96% - 99%)    Parameters below as of 16 Feb 2024 05:06  Patient On (Oxygen Delivery Method): room air      PHYSICAL EXAM  Gen: Lying in bed, NAD  Resp: No increased WOB  Spine:  Drain x2 in place w/ SS drainage  Dressings c/d/i    Motor:                   C5                C6              C7               C8           T1   R            5/5                5/5            5/5             5/5          5/5  L             5/5               5/5             5/5             5/5          5/5                L2             L3             L4               L5            S1  R         5/5           5/5          5/5             5/5           5/5  L          5/5          5/5           5/5             5/5           5/5    Sensory:            C5         C6         C7      C8       T1        (0=absent, 1=impaired, 2=normal, NT=not testable)  R         2            2           2        2         2  L          2            2           2        2         2               L2          L3         L4      L5       S1         (0=absent, 1=impaired, 2=normal, NT=not testable)  R         2            2            2        2        2  L          2            2           2        2         2        ASSESSMENT & PLAN  29yo Male s/p T2-L1 on 2/14/24.  -WBAT  -pain control  -monitor drain outputs, 1 drain removed, HMV still in place  -incentive spirometry  -DVT ppx: ambulation/SCDs  -PT/OT  -standing XR done  -dispo planning possibly home today vs tomorrow, pending pain control      
SUBJECTIVE  No acute events overnight. Pain controlled.    OBJECTIVE  Vital Signs Last 24 Hrs  T(C): 36.7 (16 Feb 2024 05:06), Max: 37.2 (15 Feb 2024 17:55)  T(F): 98.1 (16 Feb 2024 05:06), Max: 98.9 (15 Feb 2024 17:55)  HR: 107 (16 Feb 2024 05:06) (75 - 107)  BP: 125/63 (16 Feb 2024 05:06) (123/67 - 156/69)  BP(mean): --  RR: 17 (16 Feb 2024 05:06) (17 - 18)  SpO2: 96% (16 Feb 2024 05:06) (96% - 99%)    Parameters below as of 16 Feb 2024 05:06  Patient On (Oxygen Delivery Method): room air      PHYSICAL EXAM  Gen: Lying in bed, NAD  Resp: No increased WOB  Spine:  Drain x2 in place w/ SS drainage  Dressings c/d/i    Motor:                   C5                C6              C7               C8           T1   R            5/5                5/5            5/5             5/5          5/5  L             5/5               5/5             5/5             5/5          5/5                L2             L3             L4               L5            S1  R         5/5           5/5          5/5             5/5           5/5  L          5/5          5/5           5/5             5/5           5/5    Sensory:            C5         C6         C7      C8       T1        (0=absent, 1=impaired, 2=normal, NT=not testable)  R         2            2           2        2         2  L          2            2           2        2         2               L2          L3         L4      L5       S1         (0=absent, 1=impaired, 2=normal, NT=not testable)  R         2            2            2        2        2  L          2            2           2        2         2        ASSESSMENT & PLAN  28yMale s/p T2-L1 on 2/14/24.  -WBAT  -pain control  -monitor drain outputs  -incentive spirometry  -DVT ppx: ambulation/SCDs  -PT/OT  -standing XR done  -dispo planning possibly home today

## 2024-02-17 VITALS — SYSTOLIC BLOOD PRESSURE: 143 MMHG | HEART RATE: 91 BPM | DIASTOLIC BLOOD PRESSURE: 77 MMHG

## 2024-02-17 RX ORDER — ONDANSETRON 8 MG/1
1 TABLET, FILM COATED ORAL
Qty: 2 | Refills: 0
Start: 2024-02-17 | End: 2024-02-21

## 2024-02-17 RX ORDER — SENNA PLUS 8.6 MG/1
2 TABLET ORAL
Qty: 20 | Refills: 0
Start: 2024-02-17 | End: 2024-02-26

## 2024-02-17 RX ORDER — POLYETHYLENE GLYCOL 3350 17 G/17G
17 POWDER, FOR SOLUTION ORAL
Qty: 170 | Refills: 0
Start: 2024-02-17 | End: 2024-02-26

## 2024-02-17 RX ADMIN — Medication 30 MILLIGRAM(S): at 12:52

## 2024-02-17 RX ADMIN — Medication 30 MILLIGRAM(S): at 06:40

## 2024-02-17 RX ADMIN — Medication 30 MILLIGRAM(S): at 01:30

## 2024-02-17 RX ADMIN — OXYCODONE HYDROCHLORIDE 10 MILLIGRAM(S): 5 TABLET ORAL at 03:44

## 2024-02-17 RX ADMIN — Medication 650 MILLIGRAM(S): at 06:40

## 2024-02-17 RX ADMIN — Medication 30 MILLIGRAM(S): at 00:53

## 2024-02-17 RX ADMIN — ONDANSETRON 4 MILLIGRAM(S): 8 TABLET, FILM COATED ORAL at 13:05

## 2024-02-17 RX ADMIN — ONDANSETRON 4 MILLIGRAM(S): 8 TABLET, FILM COATED ORAL at 19:34

## 2024-02-17 RX ADMIN — OXYCODONE HYDROCHLORIDE 10 MILLIGRAM(S): 5 TABLET ORAL at 08:32

## 2024-02-17 RX ADMIN — Medication 650 MILLIGRAM(S): at 12:52

## 2024-02-17 RX ADMIN — Medication 650 MILLIGRAM(S): at 13:30

## 2024-02-17 RX ADMIN — Medication 10 MILLIGRAM(S): at 13:04

## 2024-02-17 RX ADMIN — OXYCODONE HYDROCHLORIDE 10 MILLIGRAM(S): 5 TABLET ORAL at 16:04

## 2024-02-17 RX ADMIN — OXYCODONE HYDROCHLORIDE 10 MILLIGRAM(S): 5 TABLET ORAL at 09:30

## 2024-02-17 RX ADMIN — PANTOPRAZOLE SODIUM 40 MILLIGRAM(S): 20 TABLET, DELAYED RELEASE ORAL at 06:17

## 2024-02-17 RX ADMIN — Medication 30 MILLIGRAM(S): at 13:30

## 2024-02-17 RX ADMIN — Medication 650 MILLIGRAM(S): at 06:18

## 2024-02-17 RX ADMIN — Medication 650 MILLIGRAM(S): at 01:30

## 2024-02-17 RX ADMIN — POLYETHYLENE GLYCOL 3350 17 GRAM(S): 17 POWDER, FOR SOLUTION ORAL at 12:52

## 2024-02-17 RX ADMIN — ONDANSETRON 4 MILLIGRAM(S): 8 TABLET, FILM COATED ORAL at 06:17

## 2024-02-17 RX ADMIN — OXYCODONE HYDROCHLORIDE 10 MILLIGRAM(S): 5 TABLET ORAL at 16:34

## 2024-02-17 RX ADMIN — Medication 10 MILLIGRAM(S): at 00:53

## 2024-02-17 RX ADMIN — Medication 650 MILLIGRAM(S): at 00:53

## 2024-02-17 RX ADMIN — Medication 30 MILLIGRAM(S): at 06:17

## 2024-02-17 RX ADMIN — OXYCODONE HYDROCHLORIDE 10 MILLIGRAM(S): 5 TABLET ORAL at 04:14

## 2024-02-17 NOTE — PROGRESS NOTE ADULT - SUBJECTIVE AND OBJECTIVE BOX
[facility] [id number]    Patient stable, tolerating diet, pain controlled on regimen.      T(C): 36.4 (02-16-24 @ 07:54), Max: 37.2 (02-15-24 @ 17:55)  HR: 88 (02-16-24 @ 07:54) (81 - 107)  BP: 142/73 (02-16-24 @ 07:54) (123/67 - 156/69)  RR: 18 (02-16-24 @ 07:54) (17 - 18)  SpO2: 97% (02-16-24 @ 07:54) (96% - 99%)  Wt(kg): --  NAD  Back: Dressing clean/dry/adherent.  Soft.  No collection.  Drains in situ.  BLE: No calf tenderness.      02-15 @ 07:01  -  02-16 @ 07:00  --------------------------------------------------------  IN: 1090 mL / OUT: 1395 mL / NET: -305 mL    02-16 @ 07:01  -  02-16 @ 10:49  --------------------------------------------------------  IN: 200 mL / OUT: 280 mL / NET: -80 mL      Hemovac: 290cc  GIOVANNI: 5cc  acetaminophen     Tablet .. 650 milliGRAM(s) Oral every 6 hours  artificial tears (preservative free) Ophthalmic Solution 1 Drop(s) Both EYES every 1 hour PRN  diazepam    Tablet 10 milliGRAM(s) Oral every 6 hours PRN  HYDROmorphone  Injectable 0.5 milliGRAM(s) IV Push every 4 hours PRN  ketorolac   Injectable 30 milliGRAM(s) IV Push every 6 hours  lactated ringers. 1000 milliLiter(s) IV Continuous <Continuous>  magnesium hydroxide Suspension 30 milliLiter(s) Oral every 12 hours PRN  naloxone Injectable 0.1 milliGRAM(s) IV Push every 3 minutes PRN  ondansetron Injectable 4 milliGRAM(s) IV Push every 6 hours PRN  oxyCODONE    IR 5 milliGRAM(s) Oral every 4 hours PRN  oxyCODONE    IR 10 milliGRAM(s) Oral every 4 hours PRN  pantoprazole    Tablet 40 milliGRAM(s) Oral before breakfast  polyethylene glycol 3350 17 Gram(s) Oral daily  senna 2 Tablet(s) Oral at bedtime                            10.0   15.99 )-----------( 206      ( 15 Feb 2024 06:00 )             28.7     02-15    137  |  104  |  18  ----------------------------<  120<H>  4.6   |  22  |  0.75    Ca    8.7      15 Feb 2024 06:00  Phos  3.5     02-14  Mg     1.70     02-14    TPro  5.8<L>  /  Alb  3.8  /  TBili  1.0  /  DBili  x   /  AST  44<H>  /  ALT  34  /  AlkPhos  58  02-14      A/P: S/P posterior spine fusion with muscle flap reconstruction.  - Diet  - Pain control  - Drain Monitoring - d/c giovanni  - DVT PPx: SCD, chemoprophylaxis as per spine service  - Will Follow    Thank You  Chandler Shahid MD  Plastic Surgery  
SUBJECTIVE  No acute events overnight. Pain controlled.    OBJECTIVE  Vital Signs Last 24 Hrs  T(C): 36.1 (15 Feb 2024 05:02), Max: 36.9 (15 Feb 2024 01:45)  T(F): 97 (15 Feb 2024 05:02), Max: 98.4 (15 Feb 2024 01:45)  HR: 80 (15 Feb 2024 05:02) (79 - 111)  BP: 115/62 (15 Feb 2024 05:02) (113/73 - 148/90)  BP(mean): 85 (14 Feb 2024 23:00) (72 - 96)  RR: 18 (15 Feb 2024 05:02) (13 - 23)  SpO2: 100% (15 Feb 2024 05:02) (93% - 100%)  Parameters below as of 15 Feb 2024 05:02  Patient On (Oxygen Delivery Method): room air    PHYSICAL EXAM  Gen: Lying in bed, NAD  Resp: No increased WOB  Spine:  Drain x2 in place w/ SS drainage  Dressings c/d/i    Motor:                   C5                C6              C7               C8           T1   R            5/5                5/5            5/5             5/5          5/5  L             5/5               5/5             5/5             5/5          5/5                L2             L3             L4               L5            S1  R         5/5           5/5          5/5             5/5           5/5  L          5/5          5/5           5/5             5/5           5/5    Sensory:            C5         C6         C7      C8       T1        (0=absent, 1=impaired, 2=normal, NT=not testable)  R         2            2           2        2         2  L          2            2           2        2         2               L2          L3         L4      L5       S1         (0=absent, 1=impaired, 2=normal, NT=not testable)  R         2            2            2        2        2  L          2            2           2        2         2      LABS                        10.0   15.99 )-----------( 206      ( 15 Feb 2024 06:00 )             28.7     02-15    137  |  104  |  18  ----------------------------<  120<H>  4.6   |  22  |  0.75    Ca    8.7      15 Feb 2024 06:00  Phos  3.5     02-14  Mg     1.70     02-14    TPro  5.8<L>  /  Alb  3.8  /  TBili  1.0  /  DBili  x   /  AST  44<H>  /  ALT  34  /  AlkPhos  58  02-14      ASSESSMENT & PLAN  28yMale s/p T2-L1 on 2/14/24.  -WBAT  -pain control  -monitor drain outputs  -incentive spirometry  -DVT ppx: ambulation/SCDs  -PT/OT  -standing XR prior to discharge  -dispo planning
SUBJECTIVE: No acute events overnight. Pain controlled. Walked with PT yesterday.     OBJECTIVE  Vital Signs Last 24 Hrs  T(C): 36.7 (17 Feb 2024 04:25), Max: 36.8 (16 Feb 2024 16:08)  T(F): 98 (17 Feb 2024 04:25), Max: 98.3 (16 Feb 2024 20:44)  HR: 91 (17 Feb 2024 04:25) (91 - 103)  BP: 116/76 (17 Feb 2024 04:25) (116/76 - 147/71)  BP(mean): --  RR: 18 (17 Feb 2024 04:25) (18 - 18)  SpO2: 98% (17 Feb 2024 04:25) (96% - 98%)    Parameters below as of 17 Feb 2024 04:25  Patient On (Oxygen Delivery Method): room air        PHYSICAL EXAM  Gen: Lying in bed, NAD  Resp: No increased WOB  Spine:  Drain x1 in place w/ SS drainage  Dressings c/d/i    Motor:                   C5                C6              C7               C8           T1   R            5/5                5/5            5/5             5/5          5/5  L             5/5               5/5             5/5             5/5          5/5                L2             L3             L4               L5            S1  R         5/5           5/5          5/5             5/5           5/5  L          5/5          5/5           5/5             5/5           5/5    Sensory:            C5         C6         C7      C8       T1        (0=absent, 1=impaired, 2=normal, NT=not testable)  R         2            2           2        2         2  L          2            2           2        2         2               L2          L3         L4      L5       S1         (0=absent, 1=impaired, 2=normal, NT=not testable)  R         2            2            2        2        2  L          2            2           2        2         2        ASSESSMENT & PLAN  27yo Male s/p T2-L1 on 2/14/24.  -WBAT  -pain control  -monitor drain outputs, 1 drain removed, HMV still in place  -incentive spirometry  -DVT ppx: ambulation/SCDs  -PT/OT  -standing XR done  -dispo planning home today      
Anesthesia Pain Management Service    SUBJECTIVE: Patient s/p spinal morphine initially & now on surgical spinal fusion protocol with pain manageable and no problems. Reports feeling some tightness in the back but pain has been tolerable. Denies headache, able to move all extremities.   Pain Scale Score:  Refer to charted pain scores    THERAPY:    s/p spinal PF morphine yesterday.      MEDICATIONS  (STANDING):  acetaminophen     Tablet .. 650 milliGRAM(s) Oral every 6 hours  ketorolac   Injectable 30 milliGRAM(s) IV Push every 6 hours  lactated ringers. 1000 milliLiter(s) (100 mL/Hr) IV Continuous <Continuous>  pantoprazole    Tablet 40 milliGRAM(s) Oral before breakfast  polyethylene glycol 3350 17 Gram(s) Oral daily  senna 2 Tablet(s) Oral at bedtime    MEDICATIONS  (PRN):  artificial tears (preservative free) Ophthalmic Solution 1 Drop(s) Both EYES every 1 hour PRN Dry Eyes  diazepam    Tablet 10 milliGRAM(s) Oral every 6 hours PRN muscle spasms  HYDROmorphone  Injectable 0.5 milliGRAM(s) IV Push every 4 hours PRN Severe Breakthrough Pain (7 - 10)  magnesium hydroxide Suspension 30 milliLiter(s) Oral every 12 hours PRN Constipation  naloxone Injectable 0.1 milliGRAM(s) IV Push every 3 minutes PRN For ANY of the following changes in patient status:  A. RR LESS THAN 10 breaths per minute, B. Oxygen saturation LESS THAN 90%, C. Sedation score of 6  ondansetron Injectable 4 milliGRAM(s) IV Push every 6 hours PRN Nausea  oxyCODONE    IR 5 milliGRAM(s) Oral every 4 hours PRN Moderate Pain (4 - 6)  oxyCODONE    IR 10 milliGRAM(s) Oral every 4 hours PRN Severe Pain (7 - 10)      OBJECTIVE: Patient sitting in bed, significant other present.    Sedation Score:	[ x] Alert	[ ] Drowsy	[ ] Arousable	[ ] Asleep	[ ] Unresponsive    Side Effects:	[ x] None	[ ] Nausea	[ ] Vomiting	[ ] Pruritus  		  [ ] Weakness		[ ] Numbness	[ ] Other:    Vital Signs Last 24 Hrs  T(C): 37.1 (15 Feb 2024 09:22), Max: 37.1 (15 Feb 2024 09:22)  T(F): 98.8 (15 Feb 2024 09:22), Max: 98.8 (15 Feb 2024 09:22)  HR: 75 (15 Feb 2024 09:22) (75 - 111)  BP: 132/71 (15 Feb 2024 09:22) (113/73 - 148/78)  BP(mean): 85 (14 Feb 2024 23:00) (72 - 96)  RR: 18 (15 Feb 2024 09:22) (13 - 23)  SpO2: 97% (15 Feb 2024 09:22) (93% - 100%)    Parameters below as of 15 Feb 2024 09:22  Patient On (Oxygen Delivery Method): room air        ASSESSMENT/ PLAN  [x  ] Patient transitioned to prn analgesics  [ x] Pain management per primary service, pain service to sign off   [x]Documentation and Verification of current medications     Comments: Discussed at length pain medication regimen with patient. Verbalized understanding. PRN Oral/IV opioids and diazepam plus non-opioid Adjuvant analgesics as per surgical spinal fusion  protocol. May call if pain not adequately controlled.    Progress Note written now but Patient was seen earlier.
SUBJECTIVE  No acute events overnight. Pain controlled. Working with physical therapy.    OBJECTIVE  Vital Signs Last 24 Hrs  T(C): 37.1 (15 Feb 2024 09:22), Max: 37.1 (15 Feb 2024 09:22)  T(F): 98.8 (15 Feb 2024 09:22), Max: 98.8 (15 Feb 2024 09:22)  HR: 75 (15 Feb 2024 09:22) (75 - 111)  BP: 132/71 (15 Feb 2024 09:22) (113/73 - 148/78)  BP(mean): 85 (14 Feb 2024 23:00) (72 - 96)  RR: 18 (15 Feb 2024 09:22) (13 - 23)  SpO2: 97% (15 Feb 2024 09:22) (93% - 100%)    Parameters below as of 15 Feb 2024 09:22  Patient On (Oxygen Delivery Method): room air        PHYSICAL EXAM  Gen: sitting up in bed, NAD  Resp: No increased WOB  Spine:  Drain x2 in place w/ SS drainage  Dressings c/d/i    Motor:                   C5                C6              C7               C8           T1   R            5/5                5/5            5/5             5/5          5/5  L             5/5               5/5             5/5             5/5          5/5                L2             L3             L4               L5            S1  R         5/5           5/5          5/5             5/5           5/5  L          5/5          5/5           5/5             5/5           5/5    Sensory:            C5         C6         C7      C8       T1        (0=absent, 1=impaired, 2=normal, NT=not testable)  R         2            2           2        2         2  L          2            2           2        2         2               L2          L3         L4      L5       S1         (0=absent, 1=impaired, 2=normal, NT=not testable)  R         2            2            2        2        2  L          2            2           2        2         2        ASSESSMENT & PLAN  28yMale s/p T2-L1 on 2/14/24.  -WBAT  -pain control  -monitor drain outputs  -incentive spirometry  -DVT ppx: ambulation/SCDs  -PT/OT  -standing XR prior to discharge  -dispo planning

## 2024-02-23 RX ORDER — NALOXONE HYDROCHLORIDE 4 MG/.1ML
4 SPRAY NASAL
Qty: 1 | Refills: 0
Start: 2024-02-23

## 2024-02-23 RX ORDER — DIAZEPAM 5 MG
1 TABLET ORAL
Qty: 21 | Refills: 0
Start: 2024-02-23 | End: 2024-02-29

## 2024-02-23 RX ORDER — OXYCODONE HYDROCHLORIDE 5 MG/1
1 TABLET ORAL
Qty: 28 | Refills: 0
Start: 2024-02-23 | End: 2024-02-29

## 2024-02-26 RX ORDER — IBUPROFEN 600 MG/1
600 TABLET, FILM COATED ORAL 3 TIMES DAILY
Qty: 42 | Refills: 0 | Status: ACTIVE | COMMUNITY
Start: 2024-02-26 | End: 1900-01-01

## 2024-03-11 ENCOUNTER — APPOINTMENT (OUTPATIENT)
Dept: PEDIATRIC ORTHOPEDIC SURGERY | Facility: CLINIC | Age: 29
End: 2024-03-11
Payer: COMMERCIAL

## 2024-03-11 DIAGNOSIS — M41.125 ADOLESCENT IDIOPATHIC SCOLIOSIS, THORACOLUMBAR REGION: ICD-10-CM

## 2024-03-11 PROCEDURE — 72082 X-RAY EXAM ENTIRE SPI 2/3 VW: CPT

## 2024-03-11 PROCEDURE — 99024 POSTOP FOLLOW-UP VISIT: CPT

## 2024-03-11 NOTE — POST OP
[___ Weeks Post Op] : [unfilled] weeks post op [Chills] : no chills [Nausea] : no nausea [Fever] : no fever [Vomiting] : no vomiting [Excellent Pain Control] : has excellent pain control [No Sign of Infection] : is showing no signs of infection [de-identified] : adolescent idiopathic scoliosis s/p T2-L1 posterior spinal fusion with instrumentation 2/14/24  [de-identified] : Hayden is a 28 year old male who presents today with his wife for his first postoperative visit.  He reports he is overall doing well, occasionally taking Tylenol or Motrin with pain relief.  No recent need for narcotic pain medication.  He was seen by plastic surgeon last week who felt his incision was healing well.  No reported fevers, chills, nausea, or vomiting.  He reports his appetite has fully returned.  He presents today for his first postoperative visit. [de-identified] : AP and lateral scoliosis x-rays were performed and reviewed in office today.  X-rays reveal a T2-L1 posterior spinal fusion with hardware in appropriate position.  No change in hardware position when compared to perioperative x-ray.  Scoliosis correction maintained. [de-identified] : Gait: Presents ambulating independently without signs of antalgia.  Good coordination and balance noted. GENERAL: alert, cooperative, in NAD SKIN: The skin is intact, warm, pink and dry over the area examined. EYES: Normal conjunctiva, normal eyelids and pupils were equal and round. ENT: normal ears, normal nose and normal lips. CARDIOVASCULAR: brisk capillary refill, but no peripheral edema. RESPIRATORY: The patient is in no apparent respiratory distress. They're taking full deep breaths without use of accessory muscles or evidence of audible wheezes or stridor without the use of a stethoscope. Normal respiratory effort.  Spine;  Well healed midline incision. Drain sites are well healed No erythema, fluctuance, drainage or signs of infection No ttp with gentle palpation of the back  5/5 strength of upper and lower extremities Sensation is grossly intact.  Negative Negrete sign  [de-identified] : 28-year-old male with scoliosis, status post T2-L1 posterior spinal fusion on 2/14/2024, doing well. [de-identified] : Clinical exam and imaging reviewed with patient and family at length. Clinically he is doing well with improvement in his pain and discomfort. XRS of the spine performed and reviewed in office today revealing hardware in good position with appropriate curve correction. He can gradually increase his activity level as he tolerated. No contact or collision sports at this time.  Discussed care for scar and need to keep it covered during summer. Discussed activity limitations and modifications including collision and contact sports limitations. Discussed the natural history of spine fusion and time for healing. Possibility of late onset infection, nonunion, implant failure, extension of fusion, junctional arthritis, junctional kyphosis, need for implant exchange and removal and extension of fusion explained.. Patient to continue using vitamin E cream on scar.  Discussed need for shoulder./back strengthening.  Discussed exercises.  Patient to follow up for post op visit in 4 months, sooner if patient or family has any concerns.  All questions answered and understanding verbalized.  Patient and family in agreement with plan. Follow up recommended in 4 months for clinical reassessment and repeat scoliosis XRs.   We spent 30 minutes on HPI, Clinical exam, ordering/ reviewing all imaging, reviewing any existing record, reviewing findings and counseling patient to treatment, differentials, etiology, prognosis, natural history, implications on ADLs, activities limitations/modifications, answering questions and addressing concerns, treatment goals and documenting in the EHR. 22-Apr-2018

## 2024-07-15 ENCOUNTER — APPOINTMENT (OUTPATIENT)
Dept: PEDIATRIC ORTHOPEDIC SURGERY | Facility: CLINIC | Age: 29
End: 2024-07-15
Payer: MEDICAID

## 2024-07-15 PROCEDURE — 99213 OFFICE O/P EST LOW 20 MIN: CPT

## 2024-07-15 PROCEDURE — 72082 X-RAY EXAM ENTIRE SPI 2/3 VW: CPT

## 2025-06-13 NOTE — ASU PATIENT PROFILE, ADULT - MEDICATIONS BROUGHT TO HOSPITAL, PROFILE
----- Message from Jacque sent at 6/13/2025 10:56 AM CDT -----  Vm: [phone]    Pt returning call.    [phone]   no